# Patient Record
Sex: MALE | Race: WHITE | Employment: OTHER | URBAN - METROPOLITAN AREA
[De-identification: names, ages, dates, MRNs, and addresses within clinical notes are randomized per-mention and may not be internally consistent; named-entity substitution may affect disease eponyms.]

---

## 2017-03-22 PROBLEM — R53.1 WEAKNESS: Status: ACTIVE | Noted: 2017-03-22

## 2018-03-29 PROBLEM — R32 URINARY INCONTINENCE: Status: ACTIVE | Noted: 2018-03-29

## 2018-06-29 PROBLEM — G44.209 TENSION TYPE HEADACHE: Status: ACTIVE | Noted: 2018-06-29

## 2020-01-01 ENCOUNTER — HOME CARE VISIT (OUTPATIENT)
Dept: HOME HEALTH SERVICES | Facility: HOME HEALTH | Age: 75
End: 2020-01-01
Payer: MEDICARE

## 2020-01-01 ENCOUNTER — HOME CARE VISIT (OUTPATIENT)
Dept: SCHEDULING | Facility: HOME HEALTH | Age: 75
End: 2020-01-01

## 2020-01-01 ENCOUNTER — HOME HEALTH ADMISSION (OUTPATIENT)
Dept: HOME HEALTH SERVICES | Facility: HOME HEALTH | Age: 75
End: 2020-01-01
Payer: MEDICARE

## 2020-01-01 ENCOUNTER — HOME CARE VISIT (OUTPATIENT)
Dept: SCHEDULING | Facility: HOME HEALTH | Age: 75
End: 2020-01-01
Payer: MEDICARE

## 2020-01-01 ENCOUNTER — HOSPITAL ENCOUNTER (OUTPATIENT)
Dept: LAB | Age: 75
Discharge: HOME | End: 2020-10-19

## 2020-01-01 ENCOUNTER — HOSPITAL ENCOUNTER (OUTPATIENT)
Dept: LAB | Age: 75
Discharge: HOME | End: 2020-09-25
Payer: MEDICARE

## 2020-01-01 ENCOUNTER — HOME CARE VISIT (OUTPATIENT)
Dept: HOME HEALTH SERVICES | Facility: HOME HEALTH | Age: 75
End: 2020-01-01

## 2020-01-01 VITALS
RESPIRATION RATE: 17 BRPM | DIASTOLIC BLOOD PRESSURE: 68 MMHG | TEMPERATURE: 97.2 F | HEART RATE: 103 BPM | OXYGEN SATURATION: 97 % | SYSTOLIC BLOOD PRESSURE: 106 MMHG

## 2020-01-01 VITALS
SYSTOLIC BLOOD PRESSURE: 126 MMHG | DIASTOLIC BLOOD PRESSURE: 74 MMHG | HEART RATE: 77 BPM | TEMPERATURE: 97.1 F | RESPIRATION RATE: 18 BRPM

## 2020-01-01 VITALS
RESPIRATION RATE: 18 BRPM | TEMPERATURE: 97.8 F | OXYGEN SATURATION: 97 % | HEART RATE: 81 BPM | SYSTOLIC BLOOD PRESSURE: 134 MMHG | DIASTOLIC BLOOD PRESSURE: 76 MMHG

## 2020-01-01 VITALS
TEMPERATURE: 97.7 F | HEART RATE: 81 BPM | SYSTOLIC BLOOD PRESSURE: 128 MMHG | RESPIRATION RATE: 19 BRPM | DIASTOLIC BLOOD PRESSURE: 82 MMHG

## 2020-01-01 VITALS
SYSTOLIC BLOOD PRESSURE: 122 MMHG | TEMPERATURE: 98.1 F | DIASTOLIC BLOOD PRESSURE: 72 MMHG | OXYGEN SATURATION: 97 % | RESPIRATION RATE: 18 BRPM | HEART RATE: 86 BPM

## 2020-01-01 VITALS
SYSTOLIC BLOOD PRESSURE: 120 MMHG | TEMPERATURE: 97.7 F | HEART RATE: 78 BPM | RESPIRATION RATE: 18 BRPM | DIASTOLIC BLOOD PRESSURE: 60 MMHG

## 2020-01-01 VITALS
HEART RATE: 67 BPM | OXYGEN SATURATION: 99 % | RESPIRATION RATE: 15 BRPM | TEMPERATURE: 98 F | SYSTOLIC BLOOD PRESSURE: 120 MMHG | DIASTOLIC BLOOD PRESSURE: 60 MMHG

## 2020-01-01 VITALS
TEMPERATURE: 97.7 F | SYSTOLIC BLOOD PRESSURE: 120 MMHG | DIASTOLIC BLOOD PRESSURE: 62 MMHG | HEART RATE: 70 BPM | RESPIRATION RATE: 17 BRPM

## 2020-01-01 VITALS
RESPIRATION RATE: 20 BRPM | TEMPERATURE: 98.3 F | DIASTOLIC BLOOD PRESSURE: 68 MMHG | HEART RATE: 109 BPM | OXYGEN SATURATION: 96 % | SYSTOLIC BLOOD PRESSURE: 110 MMHG

## 2020-01-01 LAB
ALBUMIN SERPL-MCNC: 3.2 G/DL (ref 3.2–4.6)
ALBUMIN SERPL-MCNC: 3.3 G/DL (ref 3.2–4.6)
ALBUMIN/GLOB SERPL: 1.2 {RATIO} (ref 1.2–3.5)
ALBUMIN/GLOB SERPL: 1.2 {RATIO} (ref 1.2–3.5)
ALP SERPL-CCNC: 105 U/L (ref 50–136)
ALP SERPL-CCNC: 111 U/L (ref 50–136)
ALT SERPL-CCNC: 14 U/L (ref 12–65)
ALT SERPL-CCNC: 33 U/L (ref 12–65)
AMMONIA PLAS-SCNC: 79 UMOL/L (ref 11–32)
ANION GAP SERPL CALC-SCNC: 6 MMOL/L (ref 7–16)
ANION GAP SERPL CALC-SCNC: 6 MMOL/L (ref 7–16)
AST SERPL-CCNC: 13 U/L (ref 15–37)
AST SERPL-CCNC: 15 U/L (ref 15–37)
BILIRUB DIRECT SERPL-MCNC: 0.5 MG/DL
BILIRUB SERPL-MCNC: 0.9 MG/DL (ref 0.2–1.1)
BILIRUB SERPL-MCNC: 1.4 MG/DL (ref 0.2–1.1)
BUN SERPL-MCNC: 14 MG/DL (ref 8–23)
BUN SERPL-MCNC: 15 MG/DL (ref 8–23)
CALCIUM SERPL-MCNC: 8.7 MG/DL (ref 8.3–10.4)
CALCIUM SERPL-MCNC: 8.9 MG/DL (ref 8.3–10.4)
CHLORIDE SERPL-SCNC: 104 MMOL/L (ref 98–107)
CHLORIDE SERPL-SCNC: 107 MMOL/L (ref 98–107)
CO2 SERPL-SCNC: 27 MMOL/L (ref 21–32)
CO2 SERPL-SCNC: 29 MMOL/L (ref 21–32)
CREAT SERPL-MCNC: 0.86 MG/DL (ref 0.8–1.5)
CREAT SERPL-MCNC: 0.87 MG/DL (ref 0.8–1.5)
ERYTHROCYTE [DISTWIDTH] IN BLOOD BY AUTOMATED COUNT: 23.7 % (ref 11.9–14.6)
ERYTHROCYTE [DISTWIDTH] IN BLOOD BY AUTOMATED COUNT: 24.7 % (ref 11.9–14.6)
GLOBULIN SER CALC-MCNC: 2.6 G/DL (ref 2.3–3.5)
GLOBULIN SER CALC-MCNC: 2.8 G/DL (ref 2.3–3.5)
GLUCOSE SERPL-MCNC: 93 MG/DL (ref 65–100)
GLUCOSE SERPL-MCNC: 98 MG/DL (ref 65–100)
HCT VFR BLD AUTO: 34.7 % (ref 41.1–50.3)
HCT VFR BLD AUTO: 35.9 % (ref 41.1–50.3)
HGB BLD-MCNC: 9.2 G/DL (ref 13.6–17.2)
HGB BLD-MCNC: 9.5 G/DL (ref 13.6–17.2)
MCH RBC QN AUTO: 19.3 PG (ref 26.1–32.9)
MCH RBC QN AUTO: 20.1 PG (ref 26.1–32.9)
MCHC RBC AUTO-ENTMCNC: 26.5 G/DL (ref 31.4–35)
MCHC RBC AUTO-ENTMCNC: 26.5 G/DL (ref 31.4–35)
MCV RBC AUTO: 73 FL (ref 79.6–97.8)
MCV RBC AUTO: 75.9 FL (ref 79.6–97.8)
NRBC # BLD: 0 K/UL (ref 0–0.2)
NRBC # BLD: 0 K/UL (ref 0–0.2)
PLATELET # BLD AUTO: 158 K/UL (ref 150–450)
PLATELET # BLD AUTO: 254 K/UL (ref 150–450)
PMV BLD AUTO: 10.7 FL (ref 9.4–12.3)
PMV BLD AUTO: ABNORMAL FL (ref 9.4–12.3)
POTASSIUM SERPL-SCNC: 3.8 MMOL/L (ref 3.5–5.1)
POTASSIUM SERPL-SCNC: 3.8 MMOL/L (ref 3.5–5.1)
PROT SERPL-MCNC: 5.8 G/DL (ref 6.3–8.2)
PROT SERPL-MCNC: 6.1 G/DL (ref 6.3–8.2)
RBC # BLD AUTO: 4.57 M/UL (ref 4.23–5.6)
RBC # BLD AUTO: 4.92 M/UL (ref 4.23–5.6)
SODIUM SERPL-SCNC: 139 MMOL/L (ref 136–145)
SODIUM SERPL-SCNC: 140 MMOL/L (ref 136–145)
TSH SERPL DL<=0.005 MIU/L-ACNC: 10.5 UIU/ML (ref 0.36–3.74)
WBC # BLD AUTO: 10.6 K/UL (ref 4.3–11.1)
WBC # BLD AUTO: 9.5 K/UL (ref 4.3–11.1)

## 2020-01-01 PROCEDURE — 80053 COMPREHEN METABOLIC PANEL: CPT

## 2020-01-01 PROCEDURE — 3331090002 HH PPS REVENUE DEBIT

## 2020-01-01 PROCEDURE — 3331090001 HH PPS REVENUE CREDIT

## 2020-01-01 PROCEDURE — G0299 HHS/HOSPICE OF RN EA 15 MIN: HCPCS

## 2020-01-01 PROCEDURE — 82140 ASSAY OF AMMONIA: CPT

## 2020-01-01 PROCEDURE — 80048 BASIC METABOLIC PNL TOTAL CA: CPT

## 2020-01-01 PROCEDURE — 84443 ASSAY THYROID STIM HORMONE: CPT

## 2020-01-01 PROCEDURE — G0157 HHC PT ASSISTANT EA 15: HCPCS

## 2020-01-01 PROCEDURE — 85027 COMPLETE CBC AUTOMATED: CPT

## 2020-01-01 PROCEDURE — G0151 HHCP-SERV OF PT,EA 15 MIN: HCPCS

## 2020-01-01 PROCEDURE — 400013 HH SOC

## 2020-01-01 PROCEDURE — 80076 HEPATIC FUNCTION PANEL: CPT

## 2020-04-17 PROBLEM — R39.12 BENIGN PROSTATIC HYPERPLASIA WITH WEAK URINARY STREAM: Status: ACTIVE | Noted: 2020-01-01

## 2020-04-17 PROBLEM — N40.1 BENIGN PROSTATIC HYPERPLASIA WITH WEAK URINARY STREAM: Status: ACTIVE | Noted: 2020-01-01

## 2020-04-17 PROBLEM — E03.9 ACQUIRED HYPOTHYROIDISM: Status: ACTIVE | Noted: 2020-01-01

## 2020-09-27 NOTE — PROGRESS NOTES
Call labs all ok , except the thyroid  Level is low, increase synthroid to 0.075 mg daily #[id number] [id number]